# Patient Record
Sex: FEMALE | Race: WHITE | NOT HISPANIC OR LATINO | URBAN - METROPOLITAN AREA
[De-identification: names, ages, dates, MRNs, and addresses within clinical notes are randomized per-mention and may not be internally consistent; named-entity substitution may affect disease eponyms.]

---

## 2023-01-23 ENCOUNTER — APPOINTMENT (RX ONLY)
Dept: URBAN - METROPOLITAN AREA CLINIC 50 | Facility: CLINIC | Age: 48
Setting detail: DERMATOLOGY
End: 2023-01-23

## 2023-01-23 DIAGNOSIS — L20.89 OTHER ATOPIC DERMATITIS: ICD-10-CM

## 2023-01-23 DIAGNOSIS — L71.8 OTHER ROSACEA: ICD-10-CM

## 2023-01-23 PROCEDURE — ? COUNSELING

## 2023-01-23 PROCEDURE — ? PRESCRIPTION

## 2023-01-23 PROCEDURE — 99204 OFFICE O/P NEW MOD 45 MIN: CPT

## 2023-01-23 PROCEDURE — ? TREATMENT REGIMEN

## 2023-01-23 RX ORDER — CLOBETASOL PROPIONATE 0.5 MG/G
OINTMENT TOPICAL BID
Qty: 60 | Refills: 3 | Status: ERX | COMMUNITY
Start: 2023-01-23

## 2023-01-23 RX ADMIN — CLOBETASOL PROPIONATE: 0.5 OINTMENT TOPICAL at 00:00

## 2023-01-23 ASSESSMENT — LOCATION ZONE DERM
LOCATION ZONE: FINGER
LOCATION ZONE: FACE

## 2023-01-23 ASSESSMENT — LOCATION DETAILED DESCRIPTION DERM
LOCATION DETAILED: RIGHT SUPERIOR MEDIAL BUCCAL CHEEK
LOCATION DETAILED: RIGHT DISTAL RADIAL THUMB
LOCATION DETAILED: LEFT INFERIOR CENTRAL MALAR CHEEK
LOCATION DETAILED: LEFT MIDDLE FINGERTIP

## 2023-01-23 ASSESSMENT — LOCATION SIMPLE DESCRIPTION DERM
LOCATION SIMPLE: LEFT CHEEK
LOCATION SIMPLE: RIGHT THUMB
LOCATION SIMPLE: RIGHT CHEEK
LOCATION SIMPLE: LEFT MIDDLE FINGER

## 2023-01-23 NOTE — PROCEDURE: TREATMENT REGIMEN
Detail Level: Zone
Initiate Treatment: CLOBETASOL 0.05 % topical ointment Bid\\nSig: Apply thin layer to affected areas on hands twice a day for 2 weeks, stop for 1 week then repeat treatment (for every 2 weeks medication is used must take 1 week off)

## 2023-01-23 NOTE — PROCEDURE: COUNSELING
Detail Level: Detailed
Patient Specific Counseling (Will Not Stick From Patient To Patient): Patient recognizes alcohol as a main trigger which causes flushing but no papules. \\n\\nPatient defers treatment at this time.

## 2023-01-23 NOTE — HPI: DRY SKIN
Is This A New Presentation Or A Follow-Up?: Dry Skin
How Severe Is Your Dry Skin?: moderate
Additional History: Patient is being seen today with concern so dry splitting fingers. This has been going on since December. She’s used Mupirocin oint but the finger will not heal.

## 2023-02-08 ENCOUNTER — APPOINTMENT (RX ONLY)
Dept: URBAN - METROPOLITAN AREA CLINIC 50 | Facility: CLINIC | Age: 48
Setting detail: DERMATOLOGY
End: 2023-02-08

## 2023-02-08 DIAGNOSIS — L21.8 OTHER SEBORRHEIC DERMATITIS: ICD-10-CM

## 2023-02-08 DIAGNOSIS — D22 MELANOCYTIC NEVI: ICD-10-CM

## 2023-02-08 DIAGNOSIS — L82.1 OTHER SEBORRHEIC KERATOSIS: ICD-10-CM

## 2023-02-08 DIAGNOSIS — D18.0 HEMANGIOMA: ICD-10-CM

## 2023-02-08 DIAGNOSIS — L20.89 OTHER ATOPIC DERMATITIS: ICD-10-CM | Status: INADEQUATELY CONTROLLED

## 2023-02-08 DIAGNOSIS — D485 NEOPLASM OF UNCERTAIN BEHAVIOR OF SKIN: ICD-10-CM

## 2023-02-08 DIAGNOSIS — Z12.83 ENCOUNTER FOR SCREENING FOR MALIGNANT NEOPLASM OF SKIN: ICD-10-CM

## 2023-02-08 DIAGNOSIS — L81.4 OTHER MELANIN HYPERPIGMENTATION: ICD-10-CM

## 2023-02-08 PROBLEM — D18.01 HEMANGIOMA OF SKIN AND SUBCUTANEOUS TISSUE: Status: ACTIVE | Noted: 2023-02-08

## 2023-02-08 PROBLEM — D48.5 NEOPLASM OF UNCERTAIN BEHAVIOR OF SKIN: Status: ACTIVE | Noted: 2023-02-08

## 2023-02-08 PROBLEM — D22.61 MELANOCYTIC NEVI OF RIGHT UPPER LIMB, INCLUDING SHOULDER: Status: ACTIVE | Noted: 2023-02-08

## 2023-02-08 PROBLEM — D22.5 MELANOCYTIC NEVI OF TRUNK: Status: ACTIVE | Noted: 2023-02-08

## 2023-02-08 PROBLEM — D22.71 MELANOCYTIC NEVI OF RIGHT LOWER LIMB, INCLUDING HIP: Status: ACTIVE | Noted: 2023-02-08

## 2023-02-08 PROBLEM — D22.39 MELANOCYTIC NEVI OF OTHER PARTS OF FACE: Status: ACTIVE | Noted: 2023-02-08

## 2023-02-08 PROBLEM — D22.62 MELANOCYTIC NEVI OF LEFT UPPER LIMB, INCLUDING SHOULDER: Status: ACTIVE | Noted: 2023-02-08

## 2023-02-08 PROBLEM — D22.72 MELANOCYTIC NEVI OF LEFT LOWER LIMB, INCLUDING HIP: Status: ACTIVE | Noted: 2023-02-08

## 2023-02-08 PROCEDURE — 11102 TANGNTL BX SKIN SINGLE LES: CPT

## 2023-02-08 PROCEDURE — ? TREATMENT REGIMEN

## 2023-02-08 PROCEDURE — ? PRESCRIPTION

## 2023-02-08 PROCEDURE — 99214 OFFICE O/P EST MOD 30 MIN: CPT | Mod: 25

## 2023-02-08 PROCEDURE — ? BIOPSY BY SHAVE METHOD

## 2023-02-08 PROCEDURE — ? COUNSELING

## 2023-02-08 RX ORDER — CLOBETASOL PROPIONATE 0.5 MG/ML
SOLUTION TOPICAL BID
Qty: 150 | Refills: 2 | Status: ERX | COMMUNITY
Start: 2023-02-08

## 2023-02-08 RX ADMIN — CLOBETASOL PROPIONATE: 0.5 SOLUTION TOPICAL at 00:00

## 2023-02-08 ASSESSMENT — LOCATION SIMPLE DESCRIPTION DERM
LOCATION SIMPLE: LEFT CALF
LOCATION SIMPLE: LEFT UPPER BACK
LOCATION SIMPLE: ABDOMEN
LOCATION SIMPLE: RIGHT POPLITEAL SKIN
LOCATION SIMPLE: RIGHT SCALP
LOCATION SIMPLE: RIGHT THUMB
LOCATION SIMPLE: LEFT POPLITEAL SKIN
LOCATION SIMPLE: RIGHT CALF
LOCATION SIMPLE: RIGHT UPPER BACK
LOCATION SIMPLE: RIGHT FOREARM
LOCATION SIMPLE: UPPER BACK
LOCATION SIMPLE: LEFT CHEEK
LOCATION SIMPLE: RIGHT POSTERIOR THIGH
LOCATION SIMPLE: LEFT POSTERIOR UPPER ARM
LOCATION SIMPLE: LEFT POSTERIOR THIGH
LOCATION SIMPLE: RIGHT CHEEK
LOCATION SIMPLE: LEFT MIDDLE FINGER
LOCATION SIMPLE: RIGHT POSTERIOR UPPER ARM
LOCATION SIMPLE: LEFT FOREARM

## 2023-02-08 ASSESSMENT — LOCATION ZONE DERM
LOCATION ZONE: FINGER
LOCATION ZONE: TRUNK
LOCATION ZONE: FACE
LOCATION ZONE: ARM
LOCATION ZONE: SCALP
LOCATION ZONE: LEG

## 2023-02-08 ASSESSMENT — LOCATION DETAILED DESCRIPTION DERM
LOCATION DETAILED: RIGHT VENTRAL MEDIAL PROXIMAL FOREARM
LOCATION DETAILED: LEFT POPLITEAL SKIN
LOCATION DETAILED: RIGHT MEDIAL FRONTAL SCALP
LOCATION DETAILED: LEFT CENTRAL MALAR CHEEK
LOCATION DETAILED: LEFT PROXIMAL DORSAL FOREARM
LOCATION DETAILED: RIGHT PROXIMAL POSTERIOR THIGH
LOCATION DETAILED: RIGHT PROXIMAL POSTERIOR UPPER ARM
LOCATION DETAILED: INFERIOR THORACIC SPINE
LOCATION DETAILED: RIGHT DISTAL RADIAL THUMB
LOCATION DETAILED: LEFT DISTAL DORSAL FOREARM
LOCATION DETAILED: LEFT DISTAL POSTERIOR THIGH
LOCATION DETAILED: RIGHT INFERIOR UPPER BACK
LOCATION DETAILED: LEFT DISTAL CALF
LOCATION DETAILED: RIGHT DISTAL CALF
LOCATION DETAILED: RIGHT DISTAL POSTERIOR THIGH
LOCATION DETAILED: LEFT PROXIMAL POSTERIOR UPPER ARM
LOCATION DETAILED: PERIUMBILICAL SKIN
LOCATION DETAILED: LEFT MIDDLE FINGERTIP
LOCATION DETAILED: RIGHT INFERIOR CENTRAL MALAR CHEEK
LOCATION DETAILED: LEFT SUPERIOR UPPER BACK
LOCATION DETAILED: RIGHT POPLITEAL SKIN
LOCATION DETAILED: RIGHT DISTAL DORSAL FOREARM

## 2023-02-08 NOTE — PROCEDURE: TREATMENT REGIMEN
Detail Level: Zone
Initiate Treatment: CLOBETASOL 0.05 % topical ointment Bid\\nSig: Apply thin layer to affected areas on hands twice a day for 2 weeks, stop for 1 week then repeat treatment (for every 2 weeks medication is used must take 1 week off)
Modify Regimen: Clobetasol 0.05% topical Ointment\\nSig: Apply thin layer to affected areas on fingers twice a day for 1 week, then decrease to every other day for 1 week, then decrease to every 3rd day for 1 week

## 2023-03-01 ENCOUNTER — APPOINTMENT (RX ONLY)
Dept: URBAN - METROPOLITAN AREA CLINIC 50 | Facility: CLINIC | Age: 48
Setting detail: DERMATOLOGY
End: 2023-03-01

## 2023-03-01 DIAGNOSIS — L20.89 OTHER ATOPIC DERMATITIS: ICD-10-CM | Status: INADEQUATELY CONTROLLED

## 2023-03-01 DIAGNOSIS — L21.8 OTHER SEBORRHEIC DERMATITIS: ICD-10-CM | Status: WELL CONTROLLED

## 2023-03-01 PROCEDURE — 99214 OFFICE O/P EST MOD 30 MIN: CPT

## 2023-03-01 PROCEDURE — ? PRESCRIPTION MEDICATION MANAGEMENT

## 2023-03-01 PROCEDURE — ? COUNSELING

## 2023-03-01 PROCEDURE — ? TREATMENT REGIMEN

## 2023-03-01 PROCEDURE — ? PRESCRIPTION

## 2023-03-01 RX ORDER — TACROLIMUS 1 MG/G
OINTMENT TOPICAL BID
Qty: 60 | Refills: 3 | Status: ERX | COMMUNITY
Start: 2023-03-01

## 2023-03-01 RX ADMIN — TACROLIMUS: 1 OINTMENT TOPICAL at 00:00

## 2023-03-01 ASSESSMENT — LOCATION SIMPLE DESCRIPTION DERM
LOCATION SIMPLE: LEFT MIDDLE FINGER
LOCATION SIMPLE: SCALP
LOCATION SIMPLE: RIGHT THUMB
LOCATION SIMPLE: ANTERIOR SCALP

## 2023-03-01 ASSESSMENT — LOCATION ZONE DERM
LOCATION ZONE: FINGER
LOCATION ZONE: SCALP

## 2023-03-01 ASSESSMENT — LOCATION DETAILED DESCRIPTION DERM
LOCATION DETAILED: RIGHT SUPERIOR PARIETAL SCALP
LOCATION DETAILED: LEFT CENTRAL PARIETAL SCALP
LOCATION DETAILED: MID-FRONTAL SCALP
LOCATION DETAILED: RIGHT DISTAL RADIAL THUMB
LOCATION DETAILED: LEFT MIDDLE FINGERTIP

## 2023-03-01 NOTE — PROCEDURE: PRESCRIPTION MEDICATION MANAGEMENT
Render In Strict Bullet Format?: No
Detail Level: Zone
Modify Regimen: Clobetasol 0.05% topical Ointment\\nSig: Apply thin layer to affected areas on fingers twice a day for 2-3 more days then switch to Protopic twice a day
Initiate Treatment: Protopic 0.1 % topical ointment\\nSig: Apply twice daily to affected areas on fingers

## 2023-03-01 NOTE — PROCEDURE: TREATMENT REGIMEN
Detail Level: Zone
Initiate Treatment: Protopic 0.1 % topical ointment\\nSig: Apply twice daily to affected areas on fingers
Modify Regimen: Clobetasol 0.05% topical Ointment\\nSig: Apply thin layer to affected areas on fingers twice a day for 2-3 more days then switch to Protopic twice a day
Plan: Patient has clobetasol solution, at this time seb derm is well controlled and we will discontinue clobetasol solution until needed further.  Regimen discussed in detail.

## 2023-03-23 ENCOUNTER — APPOINTMENT (RX ONLY)
Dept: URBAN - METROPOLITAN AREA CLINIC 50 | Facility: CLINIC | Age: 48
Setting detail: DERMATOLOGY
End: 2023-03-23

## 2023-03-23 ENCOUNTER — RX ONLY (OUTPATIENT)
Age: 48
Setting detail: RX ONLY
End: 2023-03-23

## 2023-03-23 DIAGNOSIS — L20.89 OTHER ATOPIC DERMATITIS: ICD-10-CM

## 2023-03-23 DIAGNOSIS — L21.8 OTHER SEBORRHEIC DERMATITIS: ICD-10-CM | Status: WELL CONTROLLED

## 2023-03-23 PROCEDURE — ? TREATMENT REGIMEN

## 2023-03-23 PROCEDURE — ? DUPIXENT INITIATION

## 2023-03-23 PROCEDURE — ? COUNSELING

## 2023-03-23 PROCEDURE — 99214 OFFICE O/P EST MOD 30 MIN: CPT

## 2023-03-23 PROCEDURE — ? PRESCRIPTION MEDICATION MANAGEMENT

## 2023-03-23 RX ORDER — CLOBETASOL PROPIONATE 0.5 MG/G
OINTMENT TOPICAL BID
Qty: 60 | Refills: 3 | Status: ERX

## 2023-03-23 ASSESSMENT — LOCATION DETAILED DESCRIPTION DERM
LOCATION DETAILED: RIGHT DISTAL RADIAL THUMB
LOCATION DETAILED: RIGHT MIDDLE FINGERTIP
LOCATION DETAILED: RIGHT RING FINGERTIP
LOCATION DETAILED: RIGHT INDEX FINGERTIP

## 2023-03-23 ASSESSMENT — BSA ECZEMA: % BODY COVERED IN ECZEMA: 5

## 2023-03-23 ASSESSMENT — LOCATION ZONE DERM: LOCATION ZONE: FINGER

## 2023-03-23 ASSESSMENT — LOCATION SIMPLE DESCRIPTION DERM
LOCATION SIMPLE: RIGHT RING FINGER
LOCATION SIMPLE: RIGHT THUMB
LOCATION SIMPLE: RIGHT MIDDLE FINGER
LOCATION SIMPLE: RIGHT INDEX FINGER

## 2023-03-23 ASSESSMENT — ITCH NUMERIC RATING SCALE: HOW SEVERE IS YOUR ITCHING?: 10

## 2023-03-23 NOTE — PROCEDURE: DUPIXENT INITIATION
Add Pregnancy And Lactation Warning To Medication Counseling?: No
Dupixent Dosing: 600 mg SC day 0 then 300 mg SC every other week
Diagnosis (Required): Atopic Dermatitis/Eczematous Dermatitis
Pregnancy And Lactation Warning Text: There have not been adverse fetal risks in women taking Dupixent while pregnant. It is unknown if this medication is excreted in breast milk.
Detail Level: Zone
Dupixent Monitoring Guidelines: There is no laboratory monitoring requirement with Dupixent.

## 2023-03-23 NOTE — PROCEDURE: PRESCRIPTION MEDICATION MANAGEMENT
Detail Level: Zone
Plan: Patient will use clobetasol solution once weekly as maintenance.
Render In Strict Bullet Format?: No

## 2023-03-23 NOTE — PROCEDURE: TREATMENT REGIMEN
Continue Regimen: CLOBETASOL ointment apply twice daily to affected areas on finger tips for two weeks then stop for one week
Detail Level: Zone

## 2023-04-04 ENCOUNTER — RX ONLY (OUTPATIENT)
Age: 48
Setting detail: RX ONLY
End: 2023-04-04

## 2023-04-04 RX ORDER — DUPILUMAB 300 MG/2ML
TWO INJECTION, SOLUTION SUBCUTANEOUS QD
Qty: 1 | Refills: 0 | Status: ERX

## 2023-04-04 RX ORDER — DUPILUMAB 300 MG/2ML
ONE INJECTION, SOLUTION SUBCUTANEOUS EVERY OTHER WEEK
Qty: 1 | Refills: 2 | Status: ERX | COMMUNITY
Start: 2023-04-04

## 2023-05-15 ENCOUNTER — APPOINTMENT (RX ONLY)
Dept: URBAN - METROPOLITAN AREA CLINIC 50 | Facility: CLINIC | Age: 48
Setting detail: DERMATOLOGY
End: 2023-05-15

## 2023-05-15 DIAGNOSIS — L20.89 OTHER ATOPIC DERMATITIS: ICD-10-CM

## 2023-05-15 PROCEDURE — ? PRESCRIPTION

## 2023-05-15 PROCEDURE — 96372 THER/PROPH/DIAG INJ SC/IM: CPT

## 2023-05-15 PROCEDURE — ? DUPIXENT INJECTION

## 2023-05-15 RX ORDER — DUPILUMAB 300 MG/2ML
INJECTION, SOLUTION SUBCUTANEOUS EVERY OTHER WEEK
Qty: 2 | Refills: 3 | Status: CANCELLED
Stop reason: CLARIF

## 2023-05-15 ASSESSMENT — LOCATION DETAILED DESCRIPTION DERM
LOCATION DETAILED: LEFT ANTERIOR PROXIMAL THIGH
LOCATION DETAILED: RIGHT ANTERIOR PROXIMAL THIGH

## 2023-05-15 ASSESSMENT — LOCATION ZONE DERM: LOCATION ZONE: LEG

## 2023-05-15 ASSESSMENT — LOCATION SIMPLE DESCRIPTION DERM
LOCATION SIMPLE: LEFT THIGH
LOCATION SIMPLE: RIGHT THIGH

## 2023-05-15 NOTE — PROCEDURE: DUPIXENT INJECTION
Include J-Code In Bill: No
Ndc (300 Mg Prefilled Pen): [address]-16
Administered By (Optional): Chanel Mesa MA
Lot # (Optional): [id number]
Render If Medication Purchased By Clinic In Visit Note?: Yes
Consent: The risks of pain and injection site reactions were reviewed with the patient prior to the injection.
Syringe Size Used (Required For Enhanced Ndc): 300 mg/2ml prefilled pen
Dupixent Dosing: 600 mg
Additional Comments: Patient tolerated well
Treatment Number (Optional): 1
Ndc (300 Mg Prefilled Syringe): [address]-20
Expiration Date (Optional): 2025-01-31
Ndc (200 Mg Prefilled Syringe): [address]-87
Detail Level: None
J-Code:

## 2023-06-09 ENCOUNTER — RX ONLY (OUTPATIENT)
Age: 48
Setting detail: RX ONLY
End: 2023-06-09

## 2023-06-14 ENCOUNTER — APPOINTMENT (RX ONLY)
Dept: URBAN - METROPOLITAN AREA CLINIC 50 | Facility: CLINIC | Age: 48
Setting detail: DERMATOLOGY
End: 2023-06-14

## 2023-06-14 DIAGNOSIS — L20.89 OTHER ATOPIC DERMATITIS: ICD-10-CM

## 2023-06-14 PROCEDURE — ? TREATMENT REGIMEN

## 2023-06-14 PROCEDURE — ? DUPIXENT MONITORING

## 2023-06-14 PROCEDURE — 99213 OFFICE O/P EST LOW 20 MIN: CPT

## 2023-06-14 PROCEDURE — ? COUNSELING

## 2023-06-14 PROCEDURE — ? PRESCRIPTION

## 2023-06-14 RX ORDER — HALOBETASOL PROPIONATE 0.5 MG/G
OINTMENT TOPICAL
Qty: 50 | Refills: 1 | Status: ERX | COMMUNITY
Start: 2023-06-14

## 2023-06-14 RX ADMIN — HALOBETASOL PROPIONATE: 0.5 OINTMENT TOPICAL at 00:00

## 2023-06-14 ASSESSMENT — LOCATION SIMPLE DESCRIPTION DERM
LOCATION SIMPLE: LEFT POSTERIOR UPPER ARM
LOCATION SIMPLE: RIGHT FOREARM
LOCATION SIMPLE: LEFT FOREARM

## 2023-06-14 ASSESSMENT — LOCATION DETAILED DESCRIPTION DERM
LOCATION DETAILED: RIGHT VENTRAL PROXIMAL FOREARM
LOCATION DETAILED: LEFT VENTRAL DISTAL FOREARM
LOCATION DETAILED: LEFT DISTAL POSTERIOR UPPER ARM

## 2023-06-14 ASSESSMENT — LOCATION ZONE DERM: LOCATION ZONE: ARM

## 2023-06-14 NOTE — PROCEDURE: TREATMENT REGIMEN
Continue Regimen: DUPIXENT 300MG inj subQ QWK
Initiate Treatment: HALOBETASOL propionate 0.05 % topical ointment \\nSig: Apply to affected area on hands twice a day for two weeks stop for one week, repeat steps as needed for flares.
Detail Level: Zone

## 2023-06-14 NOTE — HPI: MEDICATION (DUPIXENT)
Is This A New Presentation, Or A Follow-Up?: Follow Up Yin
What Type Of Rash Do You Have?: atopic dermatitis
How Severe Is It?: moderate
How Long Have You Been Taking Dupixent?: 2 months
Additional History: Patient is being seen today to followup on atopic dermatitis. She’s been on DUPIXENT for 2 months. she states the thumb doesn’t seem to want to heal.

## 2023-07-26 ENCOUNTER — APPOINTMENT (RX ONLY)
Dept: URBAN - METROPOLITAN AREA CLINIC 50 | Facility: CLINIC | Age: 48
Setting detail: DERMATOLOGY
End: 2023-07-26

## 2023-07-26 ENCOUNTER — RX ONLY (OUTPATIENT)
Age: 48
Setting detail: RX ONLY
End: 2023-07-26

## 2023-07-26 DIAGNOSIS — L20.89 OTHER ATOPIC DERMATITIS: ICD-10-CM | Status: WELL CONTROLLED

## 2023-07-26 PROCEDURE — ? COUNSELING

## 2023-07-26 PROCEDURE — ? TREATMENT REGIMEN

## 2023-07-26 PROCEDURE — ? DUPIXENT MONITORING

## 2023-07-26 PROCEDURE — 99213 OFFICE O/P EST LOW 20 MIN: CPT

## 2023-07-26 RX ORDER — DUPILUMAB 300 MG/2ML
ONE INJECTION, SOLUTION SUBCUTANEOUS EVERY OTHER WEEK
Qty: 1 | Refills: 3 | Status: ERX

## 2023-07-26 ASSESSMENT — LOCATION DETAILED DESCRIPTION DERM
LOCATION DETAILED: LEFT VENTRAL DISTAL FOREARM
LOCATION DETAILED: LEFT DISTAL POSTERIOR UPPER ARM
LOCATION DETAILED: RIGHT VENTRAL PROXIMAL FOREARM

## 2023-07-26 ASSESSMENT — LOCATION ZONE DERM: LOCATION ZONE: ARM

## 2023-07-26 NOTE — PROCEDURE: TREATMENT REGIMEN
Discontinue Regimen: HALOBETASOL propionate 0.05 % topical ointment \\nSig: Apply to affected area on hands twice a day for two weeks stop for one week, repeat steps as needed for flares. (ONLY USE AS NEEDED)
Continue Regimen: DUPIXENT 300MG inj subQ QOWK (refilled today)
Detail Level: Zone
PMD

## 2023-07-26 NOTE — HPI: MEDICATION (DUPIXENT)
Is This A New Presentation, Or A Follow-Up?: Follow Up Yin
What Type Of Rash Do You Have?: atopic dermatitis
How Severe Is It?: moderate
How Long Have You Been Taking Dupixent?: 3 months
Additional History: Patient is being seen today to follow up on atopic. She’s currently on DUPIXENT and is 3 months into therapy. She is doing great.

## 2023-10-16 ENCOUNTER — WEB ENCOUNTER (OUTPATIENT)
Dept: URBAN - METROPOLITAN AREA CLINIC 118 | Facility: CLINIC | Age: 48
End: 2023-10-16

## 2023-10-19 ENCOUNTER — DASHBOARD ENCOUNTERS (OUTPATIENT)
Age: 48
End: 2023-10-19

## 2023-10-19 ENCOUNTER — OFFICE VISIT (OUTPATIENT)
Dept: URBAN - METROPOLITAN AREA CLINIC 118 | Facility: CLINIC | Age: 48
End: 2023-10-19
Payer: COMMERCIAL

## 2023-10-19 ENCOUNTER — LAB OUTSIDE AN ENCOUNTER (OUTPATIENT)
Dept: URBAN - METROPOLITAN AREA CLINIC 118 | Facility: CLINIC | Age: 48
End: 2023-10-19

## 2023-10-19 VITALS
HEIGHT: 68 IN | SYSTOLIC BLOOD PRESSURE: 123 MMHG | TEMPERATURE: 98.1 F | HEART RATE: 93 BPM | WEIGHT: 180.8 LBS | DIASTOLIC BLOOD PRESSURE: 66 MMHG | BODY MASS INDEX: 27.4 KG/M2

## 2023-10-19 DIAGNOSIS — K21.9 GERD WITHOUT ESOPHAGITIS: ICD-10-CM

## 2023-10-19 DIAGNOSIS — D50.9 IRON DEFICIENCY ANEMIA, UNSPECIFIED IRON DEFICIENCY ANEMIA TYPE: ICD-10-CM

## 2023-10-19 DIAGNOSIS — Z12.11 SCREEN FOR COLON CANCER: ICD-10-CM

## 2023-10-19 DIAGNOSIS — K62.5 RECTAL BLEEDING: ICD-10-CM

## 2023-10-19 PROBLEM — 87522002: Status: ACTIVE | Noted: 2023-10-19

## 2023-10-19 PROBLEM — 266435005: Status: ACTIVE | Noted: 2023-10-19

## 2023-10-19 PROCEDURE — 99204 OFFICE O/P NEW MOD 45 MIN: CPT | Performed by: INTERNAL MEDICINE

## 2023-10-19 NOTE — HPI-TODAY'S VISIT:
pt presents for GI evaluation. pt h/o gerd now reports recent constipation and with that occasional rectal bleeding. pt notes q 3-4 days bm and with that occasional rectal bleeding. pt notes recently notes iron deficiency anemia and on IV iron replacement. pt notes occasional gerd with nausea. Pt denies other UGI symptoms. pt referred for further evaluation.

## 2023-11-06 ENCOUNTER — RX ONLY (OUTPATIENT)
Age: 48
Setting detail: RX ONLY
End: 2023-11-06

## 2023-11-06 RX ORDER — DUPILUMAB 300 MG/2ML
ONE INJECTION, SOLUTION SUBCUTANEOUS EVERY OTHER WEEK
Qty: 1 | Refills: 3 | Status: ERX

## 2023-11-13 ENCOUNTER — OFFICE VISIT (OUTPATIENT)
Dept: URBAN - METROPOLITAN AREA SURGERY CENTER 23 | Facility: SURGERY CENTER | Age: 48
End: 2023-11-13
Payer: COMMERCIAL

## 2023-11-13 ENCOUNTER — CLAIMS CREATED FROM THE CLAIM WINDOW (OUTPATIENT)
Dept: URBAN - METROPOLITAN AREA CLINIC 4 | Facility: CLINIC | Age: 48
End: 2023-11-13
Payer: COMMERCIAL

## 2023-11-13 ENCOUNTER — TELEPHONE ENCOUNTER (OUTPATIENT)
Dept: URBAN - METROPOLITAN AREA CLINIC 118 | Facility: CLINIC | Age: 48
End: 2023-11-13

## 2023-11-13 DIAGNOSIS — K21.9 ACID REFLUX: ICD-10-CM

## 2023-11-13 DIAGNOSIS — D50.8 OTHER IRON DEFICIENCY ANEMIA: ICD-10-CM

## 2023-11-13 DIAGNOSIS — K31.89 OTHER DISEASES OF STOMACH AND DUODENUM: ICD-10-CM

## 2023-11-13 DIAGNOSIS — B37.81 CANDIDA: ICD-10-CM

## 2023-11-13 DIAGNOSIS — K22.89 OTHER SPECIFIED DISEASE OF ESOPHAGUS: ICD-10-CM

## 2023-11-13 DIAGNOSIS — Z86.010 ADENOMAS PERSONAL HISTORY OF COLONIC POLYPS: ICD-10-CM

## 2023-11-13 DIAGNOSIS — B37.81 CANDIDAL ESOPHAGITIS: ICD-10-CM

## 2023-11-13 DIAGNOSIS — K21.9 GERD: ICD-10-CM

## 2023-11-13 DIAGNOSIS — D50.9 ANEMIA: ICD-10-CM

## 2023-11-13 PROCEDURE — 88312 SPECIAL STAINS GROUP 1: CPT | Performed by: PATHOLOGY

## 2023-11-13 PROCEDURE — 43239 EGD BIOPSY SINGLE/MULTIPLE: CPT | Performed by: INTERNAL MEDICINE

## 2023-11-13 PROCEDURE — G8907 PT DOC NO EVENTS ON DISCHARG: HCPCS | Performed by: INTERNAL MEDICINE

## 2023-11-13 PROCEDURE — 00813 ANES UPR LWR GI NDSC PX: CPT | Performed by: NURSE ANESTHETIST, CERTIFIED REGISTERED

## 2023-11-13 PROCEDURE — 00731 ANES UPR GI NDSC PX NOS: CPT | Performed by: NURSE ANESTHETIST, CERTIFIED REGISTERED

## 2023-11-13 PROCEDURE — 88305 TISSUE EXAM BY PATHOLOGIST: CPT | Performed by: PATHOLOGY

## 2023-11-13 RX ORDER — PANTOPRAZOLE SODIUM 40 MG/1
1 TABLET TABLET, DELAYED RELEASE ORAL ONCE A DAY
Qty: 30 TABLET | Refills: 5 | OUTPATIENT
Start: 2023-11-13

## 2023-11-20 ENCOUNTER — TELEPHONE ENCOUNTER (OUTPATIENT)
Dept: URBAN - METROPOLITAN AREA CLINIC 118 | Facility: CLINIC | Age: 48
End: 2023-11-20

## 2023-11-20 RX ORDER — POLYETHYLENE GLYCOL 3350, SODIUM SULFATE ANHYDROUS, SODIUM BICARBONATE, SODIUM CHLORIDE, POTASSIUM CHLORIDE 236; 22.74; 6.74; 5.86; 2.97 G/4L; G/4L; G/4L; G/4L; G/4L
ML POWDER, FOR SOLUTION ORAL
Qty: 1 | Refills: 0 | OUTPATIENT
Start: 2023-11-20 | End: 2023-11-21

## 2023-11-27 ENCOUNTER — TELEPHONE ENCOUNTER (OUTPATIENT)
Dept: URBAN - METROPOLITAN AREA CLINIC 118 | Facility: CLINIC | Age: 48
End: 2023-11-27

## 2023-11-27 RX ORDER — FLUCONAZOLE 100 MG/1
1 TABLET TABLET ORAL ONCE DAILY
Qty: 7 | Refills: 0 | OUTPATIENT
Start: 2023-11-27 | End: 2023-12-04

## 2023-12-11 ENCOUNTER — OFFICE VISIT (OUTPATIENT)
Dept: URBAN - METROPOLITAN AREA SURGERY CENTER 23 | Facility: SURGERY CENTER | Age: 48
End: 2023-12-11

## 2023-12-28 ENCOUNTER — OFFICE VISIT (OUTPATIENT)
Dept: URBAN - METROPOLITAN AREA SURGERY CENTER 23 | Facility: SURGERY CENTER | Age: 48
End: 2023-12-28

## 2024-01-22 ENCOUNTER — APPOINTMENT (RX ONLY)
Dept: URBAN - METROPOLITAN AREA CLINIC 50 | Facility: CLINIC | Age: 49
Setting detail: DERMATOLOGY
End: 2024-01-22

## 2024-01-22 ENCOUNTER — RX ONLY (OUTPATIENT)
Age: 49
Setting detail: RX ONLY
End: 2024-01-22

## 2024-01-22 DIAGNOSIS — L20.89 OTHER ATOPIC DERMATITIS: ICD-10-CM

## 2024-01-22 DIAGNOSIS — L71.8 OTHER ROSACEA: ICD-10-CM

## 2024-01-22 PROCEDURE — ? PRESCRIPTION

## 2024-01-22 PROCEDURE — ? TREATMENT REGIMEN

## 2024-01-22 PROCEDURE — ? DUPIXENT MONITORING

## 2024-01-22 PROCEDURE — ? COUNSELING

## 2024-01-22 PROCEDURE — 99214 OFFICE O/P EST MOD 30 MIN: CPT

## 2024-01-22 RX ORDER — DOXYCYCLINE 40 MG/1
CAPSULE ORAL QD
Qty: 30 | Refills: 3 | Status: ERX | COMMUNITY
Start: 2024-01-22

## 2024-01-22 RX ORDER — DUPILUMAB 300 MG/2ML
ONE INJECTION, SOLUTION SUBCUTANEOUS EVERY OTHER WEEK
Qty: 1 | Refills: 3 | Status: ERX

## 2024-01-22 RX ORDER — BENZOYL PEROXIDE 50 MG/G
CREAM TOPICAL QHS
Qty: 30 | Refills: 3 | Status: ERX | COMMUNITY
Start: 2024-01-22

## 2024-01-22 RX ADMIN — DOXYCYCLINE: 40 CAPSULE ORAL at 00:00

## 2024-01-22 RX ADMIN — BENZOYL PEROXIDE: 50 CREAM TOPICAL at 00:00

## 2024-01-22 ASSESSMENT — LOCATION SIMPLE DESCRIPTION DERM
LOCATION SIMPLE: LEFT FOREARM
LOCATION SIMPLE: RIGHT FOREARM
LOCATION SIMPLE: LEFT CHEEK
LOCATION SIMPLE: RIGHT CHEEK
LOCATION SIMPLE: LEFT POSTERIOR UPPER ARM

## 2024-01-22 ASSESSMENT — LOCATION DETAILED DESCRIPTION DERM
LOCATION DETAILED: RIGHT INFERIOR CENTRAL MALAR CHEEK
LOCATION DETAILED: LEFT VENTRAL DISTAL FOREARM
LOCATION DETAILED: LEFT DISTAL POSTERIOR UPPER ARM
LOCATION DETAILED: LEFT INFERIOR CENTRAL MALAR CHEEK
LOCATION DETAILED: RIGHT VENTRAL PROXIMAL FOREARM

## 2024-01-22 ASSESSMENT — LOCATION ZONE DERM
LOCATION ZONE: ARM
LOCATION ZONE: FACE

## 2024-05-15 ENCOUNTER — ERX REFILL RESPONSE (OUTPATIENT)
Dept: URBAN - METROPOLITAN AREA CLINIC 118 | Facility: CLINIC | Age: 49
End: 2024-05-15

## 2024-05-15 RX ORDER — PANTOPRAZOLE SODIUM 40 MG/1
1 TABLET TABLET, DELAYED RELEASE ORAL ONCE A DAY
Qty: 30 TABLET | Refills: 5 | OUTPATIENT

## 2024-05-15 RX ORDER — PANTOPRAZOLE SODIUM 40 MG/1
TAKE 1 TABLET BY MOUTH EVERY DAY TABLET, DELAYED RELEASE ORAL
Qty: 30 TABLET | Refills: 0 | OUTPATIENT